# Patient Record
Sex: MALE | Race: OTHER | URBAN - NONMETROPOLITAN AREA
[De-identification: names, ages, dates, MRNs, and addresses within clinical notes are randomized per-mention and may not be internally consistent; named-entity substitution may affect disease eponyms.]

---

## 2018-03-08 NOTE — PATIENT DISCUSSION
needs HVF to assess but CVF is very definitive.  Needs medical HILARIO.  Ed current known risk factors are HTN and higher chol.

## 2018-03-08 NOTE — PATIENT DISCUSSION
MRI is required - will arrange.  Pt wants to go to [place] and eval with [facility].  Ed will likely need CDU and echo with full HILARIO.

## 2022-03-29 ENCOUNTER — NEW PATIENT (OUTPATIENT)
Dept: URBAN - NONMETROPOLITAN AREA CLINIC 1 | Facility: CLINIC | Age: 72
End: 2022-03-29

## 2022-03-29 DIAGNOSIS — H52.4: ICD-10-CM

## 2022-03-29 PROCEDURE — 92015 DETERMINE REFRACTIVE STATE: CPT

## 2022-03-29 PROCEDURE — 92004 COMPRE OPH EXAM NEW PT 1/>: CPT

## 2022-03-29 ASSESSMENT — TONOMETRY
OD_IOP_MMHG: 22
OS_IOP_MMHG: 22

## 2022-03-29 ASSESSMENT — VISUAL ACUITY
OD_BAT: 20/60
OD_CC: 20/30-2
OS_CC: 20/50

## 2022-03-29 NOTE — PATIENT DISCUSSION
Discussed diagnosis in detail with patient. Do not recommend getting glasses due to cataracts progressing. Continue to monitor.

## 2022-03-29 NOTE — PATIENT DISCUSSION
Discussed diagnosis in detail with patient. Recommend cataract eval with Dr. Brenden Douglas, patient agrees with plan. Unable to view posterior pole of OS due to cataracts.

## 2022-06-15 ENCOUNTER — CONSULTATION/EVALUATION (OUTPATIENT)
Dept: URBAN - NONMETROPOLITAN AREA CLINIC 1 | Facility: CLINIC | Age: 72
End: 2022-06-15

## 2022-06-15 DIAGNOSIS — H25.13: ICD-10-CM

## 2022-06-15 PROCEDURE — 99214 OFFICE O/P EST MOD 30 MIN: CPT

## 2022-06-15 ASSESSMENT — VISUAL ACUITY
OD_SC: 20/80-1
OS_PH: 20/100
OS_CC: 20/300
OS_CC: 20/200
OD_CC: 20/40-1
OD_CC: 20/40
OD_BAT: 20/300
OD_PH: 20/30-1
OD_PAM: 20/25-1
OS_SC: 20/400

## 2022-06-15 ASSESSMENT — TONOMETRY
OD_IOP_MMHG: 16
OS_IOP_MMHG: 16

## 2022-06-15 NOTE — PATIENT DISCUSSION
Discussed diagnosis in detail with patient. Recommend cataract eval with Dr. Mare Meyer, patient agrees with plan. Unable to view posterior pole of OS due to cataracts.

## 2022-06-15 NOTE — PATIENT DISCUSSION
(Surgical) Visually Significant (secondary to glare), discussed the risks, benefits, alternatives, and limitations of cataract surgery. The patient stated a full understanding and a desire to proceed with the procedure. The patient will need to return for pre-op appointment with cataract measurements and to have any additional questions answered, and start pre-operative eye drops as directed. Pt elects to proceed with Stand/Trad OU starting with OS first and then OD after. Discussed dense nature of cataract OS with limited view of retina. Guarded prognosis.

## 2022-06-23 ENCOUNTER — PRE-OP/H&P (OUTPATIENT)
Dept: URBAN - NONMETROPOLITAN AREA CLINIC 1 | Facility: CLINIC | Age: 72
End: 2022-06-23

## 2022-06-23 VITALS
BODY MASS INDEX: 25.46 KG/M2 | SYSTOLIC BLOOD PRESSURE: 130 MMHG | HEIGHT: 68 IN | WEIGHT: 168 LBS | DIASTOLIC BLOOD PRESSURE: 78 MMHG | HEART RATE: 68 BPM

## 2022-06-23 DIAGNOSIS — Z01.818: ICD-10-CM

## 2022-06-23 PROCEDURE — 99213 OFFICE O/P EST LOW 20 MIN: CPT

## 2022-06-23 ASSESSMENT — VISUAL ACUITY
OS_PH: 20/100
OD_BAT: 20/300
OS_SC: 20/400
OS_CC: 20/300
OD_PH: 20/30-1
OD_CC: 20/40-1
OD_CC: 20/40
OD_SC: 20/80-1
OD_PAM: 20/25-1
OS_CC: 20/200

## 2022-07-12 ENCOUNTER — POST OP/EVAL OF SECOND EYE (OUTPATIENT)
Dept: URBAN - NONMETROPOLITAN AREA CLINIC 1 | Facility: CLINIC | Age: 72
End: 2022-07-12

## 2022-07-12 ENCOUNTER — SURGERY/PROCEDURE (OUTPATIENT)
Dept: URBAN - NONMETROPOLITAN AREA CLINIC 2 | Facility: CLINIC | Age: 72
End: 2022-07-12

## 2022-07-12 VITALS
WEIGHT: 176 LBS | DIASTOLIC BLOOD PRESSURE: 85 MMHG | BODY MASS INDEX: 26.67 KG/M2 | HEART RATE: 69 BPM | HEIGHT: 68 IN | SYSTOLIC BLOOD PRESSURE: 126 MMHG

## 2022-07-12 DIAGNOSIS — Z98.42: ICD-10-CM

## 2022-07-12 DIAGNOSIS — H25.12: ICD-10-CM

## 2022-07-12 PROCEDURE — 99024 POSTOP FOLLOW-UP VISIT: CPT

## 2022-07-12 PROCEDURE — 66982 XCAPSL CTRC RMVL CPLX WO ECP: CPT

## 2022-07-12 ASSESSMENT — VISUAL ACUITY
OD_CC: 20/40
OD_PH: 20/30
OD_CC: 20/40
OD_SC: 20/80

## 2022-07-12 ASSESSMENT — TONOMETRY
OD_IOP_MMHG: 16
OS_IOP_MMHG: 14

## 2022-07-12 NOTE — PATIENT DISCUSSION
Medical clearance done today. No outstanding concerns that would preclude surgery. Patient is cleared to proceed with scheduled surgery.

## 2022-07-19 ENCOUNTER — SURGERY/PROCEDURE (OUTPATIENT)
Dept: URBAN - NONMETROPOLITAN AREA CLINIC 2 | Facility: CLINIC | Age: 72
End: 2022-07-19

## 2022-07-19 DIAGNOSIS — H25.11: ICD-10-CM

## 2022-07-19 PROCEDURE — 66984 XCAPSL CTRC RMVL W/O ECP: CPT

## 2022-07-20 ENCOUNTER — POST-OP (OUTPATIENT)
Dept: URBAN - NONMETROPOLITAN AREA CLINIC 1 | Facility: CLINIC | Age: 72
End: 2022-07-20

## 2022-07-20 DIAGNOSIS — Z98.42: ICD-10-CM

## 2022-07-20 DIAGNOSIS — Z98.41: ICD-10-CM

## 2022-07-20 PROCEDURE — 99024 POSTOP FOLLOW-UP VISIT: CPT

## 2022-07-20 RX ORDER — SODIUM CHLORIDE 50 MG/ML: 1 SOLUTION OPHTHALMIC

## 2022-07-20 ASSESSMENT — VISUAL ACUITY
OD_SC: 20/30-2
OS_SC: 20/70-2

## 2022-07-20 ASSESSMENT — TONOMETRY
OD_IOP_MMHG: 16
OS_IOP_MMHG: 16

## 2022-07-26 ENCOUNTER — POST-OP (OUTPATIENT)
Dept: URBAN - NONMETROPOLITAN AREA CLINIC 1 | Facility: CLINIC | Age: 72
End: 2022-07-26

## 2022-07-26 DIAGNOSIS — Z98.42: ICD-10-CM

## 2022-07-26 DIAGNOSIS — Z98.41: ICD-10-CM

## 2022-07-26 PROCEDURE — 99024 POSTOP FOLLOW-UP VISIT: CPT

## 2022-07-26 ASSESSMENT — VISUAL ACUITY
OD_SC: 20/25
OS_SC: 20/40-

## 2022-07-26 ASSESSMENT — TONOMETRY
OD_IOP_MMHG: 14
OS_IOP_MMHG: 15

## 2022-07-26 NOTE — PATIENT DISCUSSION
Patient is doing well post-operatively. The importance of post-op drop compliance was emphasized. Drop schedule reviewed with patient. Follow up as directed.

## 2022-08-16 ENCOUNTER — POST-OP (OUTPATIENT)
Dept: URBAN - NONMETROPOLITAN AREA CLINIC 1 | Facility: CLINIC | Age: 72
End: 2022-08-16

## 2022-08-16 DIAGNOSIS — Z98.41: ICD-10-CM

## 2022-08-16 DIAGNOSIS — H52.4: ICD-10-CM

## 2022-08-16 DIAGNOSIS — Z98.42: ICD-10-CM

## 2022-08-16 PROCEDURE — 92015 DETERMINE REFRACTIVE STATE: CPT

## 2022-08-16 PROCEDURE — 99024 POSTOP FOLLOW-UP VISIT: CPT

## 2022-08-16 ASSESSMENT — TONOMETRY
OD_IOP_MMHG: 14
OS_IOP_MMHG: 16

## 2022-08-16 ASSESSMENT — VISUAL ACUITY
OD_SC: 20/20-1
OS_SC: 20/32-

## 2022-11-30 ENCOUNTER — FOLLOW UP (OUTPATIENT)
Dept: URBAN - NONMETROPOLITAN AREA CLINIC 1 | Facility: CLINIC | Age: 72
End: 2022-11-30

## 2022-11-30 DIAGNOSIS — Z96.1: ICD-10-CM

## 2022-11-30 DIAGNOSIS — H26.493: ICD-10-CM

## 2022-11-30 PROCEDURE — 99213 OFFICE O/P EST LOW 20 MIN: CPT

## 2022-11-30 ASSESSMENT — TONOMETRY
OS_IOP_MMHG: 14
OD_IOP_MMHG: 14

## 2022-11-30 ASSESSMENT — VISUAL ACUITY
OS_PH: 20/29-2
OS_CC: 20/29-1
OD_PH: 20/29
OD_CC: 20/20-1

## 2022-11-30 NOTE — PATIENT DISCUSSION
Patient feels that vision has decreased since last visit. Recommend YAG PC OS eval with Dr. Jessi Torres, patient agrees with plan. Need BAT before seeing Dr. Jessi Torres. Continue to monitor.